# Patient Record
Sex: FEMALE | Race: WHITE | Employment: OTHER | URBAN - METROPOLITAN AREA
[De-identification: names, ages, dates, MRNs, and addresses within clinical notes are randomized per-mention and may not be internally consistent; named-entity substitution may affect disease eponyms.]

---

## 2019-11-18 ENCOUNTER — HOSPITAL ENCOUNTER (OUTPATIENT)
Dept: PHYSICAL THERAPY | Age: 74
Discharge: HOME | End: 2019-11-18
Payer: MEDICARE

## 2019-11-18 PROCEDURE — 97112 NEUROMUSCULAR REEDUCATION: CPT

## 2019-11-18 PROCEDURE — 97162 PT EVAL MOD COMPLEX 30 MIN: CPT

## 2019-11-18 NOTE — PROGRESS NOTES
[address] PHYSICAL THERAPY AT [facility] [address]. [place], [address] - Phone: [phone]  Fax: [phone] / [address] [facility]  Patient Name: Stacey Grigsby : 1945   Medical   Diagnosis: Other abnormalities of gait and mobility [R26.89] Treatment Diagnosis: Balance dysfunction   Onset Date:      Referral Source: Lolis Conway NP Start of Care [facility]): 2019   Prior Hospitalization: See medical history Provider #: [id number]   Prior Level of Function: No noted balance issues with ambulation   Comorbidities: DM   Medications: Verified on Patient Summary List     The Plan of Care and following information is based on the information from the initial evaluation.     ==================================================================================  Assessment / deleon information:   Stcaey Grigsby is a 72 y.o.  yo female with Dx of Other abnormalities of gait and mobility [R26.89]. She reports 1 year history of balance issues and has had a previous course of PT to address balance issues. On set was insidious. She denies LE weakness, numbness/ tingling, dizziness. Objectively, the patient demonstrates decreased static/dynamic functional balance, diminished ambulatory balance (FGA = 21/30 points). LE strength is WNLs except for mild bialt hip weakness L= 4/5, R= 4+/5. Sensation testing is WLS. FOTO score= 56%. Pt will benefit from PT/Vestibular rehab to address these deficits, improve functional independence and reduce imbalance with normal daily activities.  Thank you for this referral.   ===========================================================================================  Eval Complexity: History MEDIUM  Complexity : 1-2 comorbidities / personal factors will impact the outcome/ POC ;  Examination  MEDIUM Complexity : 3 Standardized tests and measures addressing body structure, function, activity limitation and / or participation in recreation ; Presentation MEDIUM Complexity : Evolving with changing characteristics ; Decision Making Other outcome measures FGA  MEDIUM; Overall Complexity MEDIUM  Problem List: impaired gait/ balance, decrease ADL/ functional abilitiies, decrease activity tolerance and decrease transfer abilities   Treatment Plan may include any combination of the following: Therapeutic exercise, Therapeutic activities, Neuromuscular re-education, Gait/balance training and Patient education  Patient / Family readiness to learn indicated by: asking questions, trying to perform skills and interest  Persons(s) to be included in education: patient (P)  Barriers to Learning/Limitations: None  Measures taken, if barriers to learning:    Patient Goal (s): Improve balance   Self reported health status: fair  Rehabilitation Potential: excellent   Short Term Goals: To be accomplished in 4-6  treatments:  1. Patient will report at least 25% reduction of symptoms with ADLs. 2. Patient will be independent and complaint with HEP TID to reduce imbalance and dizziness with ADLs. 3. FGA will improve to greater than or equal to 23/30points to demonstrate reduction of imbalance with ADLs.  Long Term Goals: To be accomplished in 10-12 treatments:  1. Patient will report at least 50% reduction of symptoms with ADLs. 2. Patient will be independent with self progression of HEP and demonstrate willingness to continue HEP after D/C to maximize/maintain gains in functional mobility. 3. FGA will improve to greater then or equal to 25/30 points to demonstrate a significant improvement in ambulatory balance.   Frequency / Duration:   Patient to be seen  2  times per week for 4-6  weeks:  Patient / Caregiver education and instruction: self care, activity modification and exercises    Therapist Signature: Lima Reyes PT Date: 24/13/6053   Certification Period: 11/18/19 to 2/15/20 Time: 9:48 AM ===========================================================================================  I certify that the above Physical Therapy Services are being furnished while the patient is under my care. I agree with the treatment plan and certify that this therapy is necessary. Physician Signature:        Date:       Time:     Please sign and return to [facility] or you may fax the signed copy to [phone]. Thank you.

## 2019-11-18 NOTE — PROGRESS NOTES
PHYSICAL THERAPY - DAILY TREATMENT NOTE     Patient Name: Nhan Santos        Date: 2019  : 1945   YES Patient  Verified  Visit #:   1     Insurance: Payor: Mike Fisher / Plan: [place] / Product Type: Medicare /      In time: 910 Out time: 945   Total Treatment Time: 35     Medicare/BCBS Time Tracking (below)   Total Timed Codes (min):  10 1:1 Treatment Time:  10     TREATMENT AREA =  Other abnormalities of gait and mobility [R26.89]    SUBJECTIVE    Pain Level (on 0 to 10 scale):  0  / 10   Medication Changes/New allergies or changes in medical history, any new surgeries or procedures?     NO    If yes, update Summary List   Subjective Functional Status/Changes:  []  No changes reported     See eval /POC         OBJECTIVE  Modalities Rationale:     decrease pain to improve patient's ability to return to PLOF      min [] Estim, type/location:                                      []  att     []  unatt     []  w/US     []  w/ice    []  w/heat    min []  Mechanical Traction: type/lbs                   []  pro   []  sup   []  int   []  cont    []  before manual    []  after manual    min []  Ultrasound, settings/location:      min []  Iontophoresis w/ dexamethasone, location:                                               []  take home patch       []  in clinic    min []  Ice     []  Heat    location/position:     min []  Vasopneumatic Device, press/temp:     min []  Other:    [] Skin assessment post-treatment (if applicable):    []  intact    []  redness- no adverse reaction     []redness  adverse reaction:       min Therapeutic Exercise:  [x]  See flow sheet   Rationale:      increase ROM and increase strength to improve the patients ability to return to PLOF      min Manual Therapy: Technique:      [] S/DTM []IASTM []PROM [] Passive Stretching   []manual TPR    []Jt manipulation:Gr I [] II []  III [] IV[]  []REIL with manual OP  Treatment Area:     Rationale:      decrease pain, increase ROM, increase tissue extensibility and decrease trigger points to improve patient's ability to return to PLOF    10 min Neuromuscular Re-ed: [x]  See flow sheet   Rationale:      improve coordination, improve balance, increase proprioception and dec dizziness to improve the patients ability to return to PLOF        min Self Care:    Rationale:    increase ROM, increase strength and improve coordination to improve the patients ability to return to PLOF    Billed With/As:   [] TE   [] TA   [] Neuro   [] Self Care Patient Education: [x] Review HEP    [] Progressed/Changed HEP based on:   [] positioning   [] body mechanics   [] transfers   [] heat/ice application    [] other:        Other Objective/Functional Measures:    See eval/ POC     Post Treatment Pain Level (on 0 to 10) scale:   0  / 10     ASSESSMENT    X  See POC     PLAN    [x]  Upgrade activities as tolerated {YES) Continue plan of care   []  Discharge due to :    []  Other:      Therapist: Elena Moreira PT    Date: 11/18/2019 Time: 9:47 AM   No future appointments.

## 2019-12-02 ENCOUNTER — HOSPITAL ENCOUNTER (OUTPATIENT)
Dept: PHYSICAL THERAPY | Age: 74
Discharge: HOME | End: 2019-12-02
Payer: MEDICARE

## 2019-12-02 PROCEDURE — 97112 NEUROMUSCULAR REEDUCATION: CPT

## 2019-12-02 PROCEDURE — 97110 THERAPEUTIC EXERCISES: CPT

## 2019-12-02 NOTE — PROGRESS NOTES
PHYSICAL THERAPY - DAILY TREATMENT NOTE     Patient Name: Levi Teixeira        Date: 2019  : 1945   YES Patient  Verified  Visit #:   2     Insurance: Payor: Starla Sparks / Plan: [place] MED[place] PART A & B / Product Type: Medicare /      In time: 311 Out time: 1010   Total Treatment Time: 25     Medicare/BCBS Time Tracking (below)   Total Timed Codes (min):  25 1:1 Treatment Time:  25     TREATMENT AREA =  Other abnormalities of gait and mobility [R26.89]    SUBJECTIVE    Pain Level (on 0 to 10 scale):  0  / 10   Medication Changes/New allergies or changes in medical history, any new surgeries or procedures? NO    If yes, update Summary List   Subjective Functional Status/Changes:  []  No changes reported       Functional improvements: \"I haven't really done the exercises but I'll try this month. \"  Patient reporting she can't return until next month  Functional impairments: Decreased balance, increased fall risk         OBJECTIVE    15 min Therapeutic Exercise:  [x]  See flow sheet   Rationale:      increase strength to improve the patients ability to improve balance correction and gait     10 min Neuromuscular Re-ed: [x]  See flow sheet   Rationale:      improve coordination, improve balance and increase proprioception to improve the patients ability to decrease fall risk       Billed With/As:   [x] TE   [] TA   [x] Neuro   [] Self Care Patient Education: [x] Review HEP    [] Progressed/Changed HEP based on:   [] positioning   [] body mechanics   [] transfers   [] heat/ice application    [] other:        Other Objective/Functional Measures: Added therex exercises     Post Treatment Pain Level (on 0 to 10) scale:   0  / 10     ASSESSMENT    Assessment/Changes in Function:     Poor compliance to HEP.   Patient given strengthening exercises and encouraged to followup at home to improve response to treatment when she returns in January     []  See Progress Note/Recertification   Patient will continue to benefit from skilled PT services to modify and progress therapeutic interventions, address functional mobility deficits, address ROM deficits, address strength deficits, analyze and address soft tissue restrictions, analyze and cue movement patterns, analyze and modify body mechanics/ergonomics, assess and modify postural abnormalities and address imbalance/dizziness to attain remaining goals.       Progress toward goals / Updated goals:    No progression towards goals     PLAN    [x]  Upgrade activities as tolerated YES Continue plan of care   []  Discharge due to :    []  Other:      Therapist: Conrad Samayoa PT    Date: 12/2/2019 Time: 9:41 AM     Future Appointments   Date Time Provider Marco Guo   12/9/2019  9:30 AM Yaz Ramos PT [facility] AT [facility]   12/12/2019  9:30 AM Derek Pfeiffer PT [facility] AT [facility]   12/16/2019  9:30 AM Yaz Ramos PT [facility] AT [facility]   12/19/2019  9:30 AM Derek Pfeiffer PT [facility]   12/23/2019  9:30 AM Caesar Mahoney PTA RE[facility] AT [facility]   12/30/2019  9:30 AM Yaz Ramos PT [facility] AT [facility]   1/2/2020 10:00 AM Derek Pfeiffer PT [facility] AT [facility]

## 2019-12-04 ENCOUNTER — APPOINTMENT (OUTPATIENT)
Dept: PHYSICAL THERAPY | Age: 74
End: 2019-12-04
Payer: MEDICARE

## 2019-12-09 ENCOUNTER — APPOINTMENT (OUTPATIENT)
Dept: PHYSICAL THERAPY | Age: 74
End: 2019-12-09
Payer: MEDICARE

## 2019-12-12 ENCOUNTER — APPOINTMENT (OUTPATIENT)
Dept: PHYSICAL THERAPY | Age: 74
End: 2019-12-12
Payer: MEDICARE

## 2019-12-16 ENCOUNTER — APPOINTMENT (OUTPATIENT)
Dept: PHYSICAL THERAPY | Age: 74
End: 2019-12-16
Payer: MEDICARE

## 2019-12-19 ENCOUNTER — APPOINTMENT (OUTPATIENT)
Dept: PHYSICAL THERAPY | Age: 74
End: 2019-12-19
Payer: MEDICARE

## 2019-12-23 ENCOUNTER — APPOINTMENT (OUTPATIENT)
Dept: PHYSICAL THERAPY | Age: 74
End: 2019-12-23
Payer: MEDICARE

## 2019-12-30 ENCOUNTER — APPOINTMENT (OUTPATIENT)
Dept: PHYSICAL THERAPY | Age: 74
End: 2019-12-30
Payer: MEDICARE

## 2020-01-02 ENCOUNTER — APPOINTMENT (OUTPATIENT)
Dept: PHYSICAL THERAPY | Age: 75
End: 2020-01-02

## 2020-02-03 NOTE — PROGRESS NOTES
Janice Lara [address] [facility] PHYSICAL THERAPY AT [facility] 93. [place], [address] [facility]  Phone: [phone]  Fax: [phone] SUMMARY FOR PHYSICAL THERAPY          Patient Name: Tatiana Rose : 1945   Treatment/Medical Diagnosis: Other abnormalities of gait and mobility [R26.89]   Onset Date:     Referral Source: Marylen Manna, NP Start of Care [facility]): 19   Prior Hospitalization: See Medical History Provider #: [id number]   Prior Level of Function: No noted balance issues with ambulation   Comorbidities: DM   Medications: Verified on Patient Summary List   Visits from [facility]: 2 Missed Visits: 3     Previous Goals:  1. Patient will report at least 25% reduction of symptoms with ADLs. 2. Patient will be independent and complaint with HEP TID to reduce imbalance and dizziness with ADLs. 3. FGA will improve to greater than or equal to 23/30points to demonstrate reduction of imbalance with ADLs. Prior Level/Current Level:  1) Prior Level: N/A   Current Level: Unable to assess   Goal Met? no  2) Prior Level: N/A   Current Level: Not compliant with HEP   Goal Met? no  3) Prior Level: 21   Current Level: Unable to assess   Goal Met? no      Key Functional Changes/Progress: Patient not compliant with therapy or HEP. Patient has been D/C'd from PT at this time    Assessments/Recommendations: Discontinue therapy due to lack of attendance or compliance. If you have any questions/comments please contact us directly at [phone]. Thank you for allowing us to assist in the care of your patient.     Therapist Signature: Cosme Raymond, PT Date: 2/3/20   Reporting Period:  19 to 2/15/20      Time: 9:09 AM